# Patient Record
(demographics unavailable — no encounter records)

---

## 2025-01-24 NOTE — PHYSICAL EXAM
[de-identified] : The patient is a well developed, well nourished male in no apparent distress. He is alert and oriented X 3 with a pleasant mood and appropriate affect.     On physical examination of the left knee, his ROM is 0-12 degrees. The patient walks with a normal gait and stands in neutral alignment. There is no effusion. No warmth or erythema is noted. The patella is nontender to palpation There is patellofemoral crepitus noted. The apprehension and grind tests are negative. The extensor mechanism is intact. There is medial  joint line tenderness. The Ovidio sign is absent. The Lachman and pivot shift tests are negative. There is no varus or valgus laxity at 0 or 30 degrees. No posterolateral or anteromedial laxity is noted. No masses are palpable. No other soft tissue or bony tenderness is noted. T Quadriceps weakness is noted. Neurovascular function is intact.

## 2025-01-24 NOTE — PROCEDURE
[de-identified] : CC' left knee pain and clicking DX: left knee medial compartment DJD   Under strict sterile technique, the left  knee was prepped with Chloraprep Using the superolateral approach, with the patient supine, 1ml of Kenalog was mixed with 5mls of 1% Lidocaine and 5mLs of 0.5% Marcaine, and was injected intraarticularly. The patient tolerated the procedure well. The patient was instructed to avoid vigorous exercise for 24 hours and will apply ice to the knee for 20 minutes 2-3 times per day if discomfort occurs. Patient will return on an as needed basis. The patient will call if any questions or problems should arise  Injection was administered by Ron Vang MD

## 2025-01-24 NOTE — HISTORY OF PRESENT ILLNESS
[de-identified] : Jose returns today for evaluation of his left knee. he has DJD in both knees. He was doing well until Hiren. He did a lot of stair climbing and developed medial knee pain and clicking in his left knee. He has had intermittent swelling. He has ongoing pain with stairs and curbs. He denies any locking or buckling. He has been taking Tylenol with mild improvement.

## 2025-01-24 NOTE — DISCUSSION/SUMMARY
[de-identified] : Mr Velez has symptomatic DJD which was excerbated by extensive stair climbing over the Christmas holidays. He received a cortisone injection today. He will increase his activities as tolerated. He will return on an as needed basis. He will call if any issues arise

## 2025-01-24 NOTE — PROCEDURE
[de-identified] : CC' left knee pain and clicking DX: left knee medial compartment DJD   Under strict sterile technique, the left  knee was prepped with Chloraprep Using the superolateral approach, with the patient supine, 1ml of Kenalog was mixed with 5mls of 1% Lidocaine and 5mLs of 0.5% Marcaine, and was injected intraarticularly. The patient tolerated the procedure well. The patient was instructed to avoid vigorous exercise for 24 hours and will apply ice to the knee for 20 minutes 2-3 times per day if discomfort occurs. Patient will return on an as needed basis. The patient will call if any questions or problems should arise  Injection was administered by Ron Vang MD

## 2025-01-24 NOTE — REVIEW OF SYSTEMS
[Joint Pain] : joint pain [Joint Stiffness] : joint stiffness Admission Reconciliation is Not Complete  Discharge Reconciliation is Completed

## 2025-01-24 NOTE — END OF VISIT
[FreeTextEntry3] : Dr Vang personally administered the injection today. Dr Vang has reviewed the chart and agrees that the record accurately reflects his personal performance of the history, physical exam, assessment, and plan. He personally directed, reviewed, and agreed with the chart.All medical record  entries made by BRANDON Stokes, acting as a scribe for this encounter under the direction of Ron Vang MD

## 2025-01-24 NOTE — DISCUSSION/SUMMARY
[de-identified] : Mr Velez has symptomatic DJD which was excerbated by extensive stair climbing over the Christmas holidays. He received a cortisone injection today. He will increase his activities as tolerated. He will return on an as needed basis. He will call if any issues arise

## 2025-01-24 NOTE — HISTORY OF PRESENT ILLNESS
[de-identified] : Jose returns today for evaluation of his left knee. he has DJD in both knees. He was doing well until Hiren. He did a lot of stair climbing and developed medial knee pain and clicking in his left knee. He has had intermittent swelling. He has ongoing pain with stairs and curbs. He denies any locking or buckling. He has been taking Tylenol with mild improvement.

## 2025-01-24 NOTE — HISTORY OF PRESENT ILLNESS
[de-identified] : Jose returns today for evaluation of his left knee. he has DJD in both knees. He was doing well until Hiren. He did a lot of stair climbing and developed medial knee pain and clicking in his left knee. He has had intermittent swelling. He has ongoing pain with stairs and curbs. He denies any locking or buckling. He has been taking Tylenol with mild improvement.

## 2025-01-24 NOTE — PHYSICAL EXAM
[de-identified] : The patient is a well developed, well nourished male in no apparent distress. He is alert and oriented X 3 with a pleasant mood and appropriate affect.     On physical examination of the left knee, his ROM is 0-12 degrees. The patient walks with a normal gait and stands in neutral alignment. There is no effusion. No warmth or erythema is noted. The patella is nontender to palpation There is patellofemoral crepitus noted. The apprehension and grind tests are negative. The extensor mechanism is intact. There is medial  joint line tenderness. The Ovidio sign is absent. The Lachman and pivot shift tests are negative. There is no varus or valgus laxity at 0 or 30 degrees. No posterolateral or anteromedial laxity is noted. No masses are palpable. No other soft tissue or bony tenderness is noted. T Quadriceps weakness is noted. Neurovascular function is intact.

## 2025-01-24 NOTE — PROCEDURE
[de-identified] : CC' left knee pain and clicking DX: left knee medial compartment DJD   Under strict sterile technique, the left  knee was prepped with Chloraprep Using the superolateral approach, with the patient supine, 1ml of Kenalog was mixed with 5mls of 1% Lidocaine and 5mLs of 0.5% Marcaine, and was injected intraarticularly. The patient tolerated the procedure well. The patient was instructed to avoid vigorous exercise for 24 hours and will apply ice to the knee for 20 minutes 2-3 times per day if discomfort occurs. Patient will return on an as needed basis. The patient will call if any questions or problems should arise  Injection was administered by Ron Vang MD

## 2025-01-24 NOTE — PHYSICAL EXAM
[de-identified] : The patient is a well developed, well nourished male in no apparent distress. He is alert and oriented X 3 with a pleasant mood and appropriate affect.     On physical examination of the left knee, his ROM is 0-12 degrees. The patient walks with a normal gait and stands in neutral alignment. There is no effusion. No warmth or erythema is noted. The patella is nontender to palpation There is patellofemoral crepitus noted. The apprehension and grind tests are negative. The extensor mechanism is intact. There is medial  joint line tenderness. The Ovidio sign is absent. The Lachman and pivot shift tests are negative. There is no varus or valgus laxity at 0 or 30 degrees. No posterolateral or anteromedial laxity is noted. No masses are palpable. No other soft tissue or bony tenderness is noted. T Quadriceps weakness is noted. Neurovascular function is intact.

## 2025-01-24 NOTE — DISCUSSION/SUMMARY
[de-identified] : Mr Velez has symptomatic DJD which was excerbated by extensive stair climbing over the Christmas holidays. He received a cortisone injection today. He will increase his activities as tolerated. He will return on an as needed basis. He will call if any issues arise

## 2025-01-24 NOTE — END OF VISIT
[FreeTextEntry3] : Dr Vang personally administered the injection today. Dr Vang has reviewed the chart and agrees that the record accurately reflects his personal performance of the history, physical exam, assessment, and plan. He personally directed, reviewed, and agreed with the chart.All medical record  entries made by BRANDNO Stokes, acting as a scribe for this encounter under the direction of Ron Vang MD

## 2025-05-28 NOTE — DISCUSSION/SUMMARY
[de-identified] : Mr Velez has symptomatic DJD in his left knee. He is going on an active vacation in [place] and he received a cortisone injection today. He will increase his activities as tolerated. He will return on an as needed basis. He will call if any issues arise

## 2025-05-28 NOTE — DISCUSSION/SUMMARY
[de-identified] : Mr Velez has symptomatic DJD in his left knee. He is going on an active vacation in [place] and he received a cortisone injection today. He will increase his activities as tolerated. He will return on an as needed basis. He will call if any issues arise

## 2025-05-28 NOTE — PROCEDURE
[de-identified] : CC' left knee pain and clicking DX: left knee medial compartment DJD   Under strict sterile technique, the left  knee was prepped with Chloraprep Using the superolateral approach, with the patient supine, 1ml of Kenalog was mixed with 5mls of 1% Lidocaine and 5mLs of 0.5% Marcaine, and was injected intraarticularly. The patient tolerated the procedure well. The patient was instructed to avoid vigorous exercise for 24 hours and will apply ice to the knee for 20 minutes 2-3 times per day if discomfort occurs. Patient will return on an as needed basis. The patient will call if any questions or problems should arise  Injection was administered by Ron Vang MD

## 2025-05-28 NOTE — PROCEDURE
[de-identified] : CC' left knee pain and clicking DX: left knee medial compartment DJD   Under strict sterile technique, the left  knee was prepped with Chloraprep Using the superolateral approach, with the patient supine, 1ml of Kenalog was mixed with 5mls of 1% Lidocaine and 5mLs of 0.5% Marcaine, and was injected intraarticularly. The patient tolerated the procedure well. The patient was instructed to avoid vigorous exercise for 24 hours and will apply ice to the knee for 20 minutes 2-3 times per day if discomfort occurs. Patient will return on an as needed basis. The patient will call if any questions or problems should arise  Injection was administered by Ron Vang MD

## 2025-05-28 NOTE — HISTORY OF PRESENT ILLNESS
[de-identified] : Jose returns today for a cortisone injection in his left knee. He has symptomatic DJD and has managed well in the past with cortisone and HA injections. He is going to [place] next week and is anticipating a lot of walking and stair climbing. He has developed increased medial knee pain over the last month. He denies any locking or buckling.

## 2025-05-28 NOTE — PROCEDURE
[de-identified] : CC' left knee pain and clicking DX: left knee medial compartment DJD   Under strict sterile technique, the left  knee was prepped with Chloraprep Using the superolateral approach, with the patient supine, 1ml of Kenalog was mixed with 5mls of 1% Lidocaine and 5mLs of 0.5% Marcaine, and was injected intraarticularly. The patient tolerated the procedure well. The patient was instructed to avoid vigorous exercise for 24 hours and will apply ice to the knee for 20 minutes 2-3 times per day if discomfort occurs. Patient will return on an as needed basis. The patient will call if any questions or problems should arise  Injection was administered by Ron Vang MD

## 2025-05-28 NOTE — PHYSICAL EXAM
[de-identified] : The patient is a well developed, well nourished male in no apparent distress. He is alert and oriented X 3 with a pleasant mood and appropriate affect.     On physical examination of the left knee, his ROM is 0-12 degrees. The patient walks with a normal gait and stands in neutral alignment. There is no effusion. No warmth or erythema is noted. The patella is nontender to palpation There is patellofemoral crepitus noted. The apprehension and grind tests are negative. The extensor mechanism is intact. There is medial  joint line tenderness. The Ovidio sign is absent. The Lachman and pivot shift tests are negative. There is no varus or valgus laxity at 0 or 30 degrees. No posterolateral or anteromedial laxity is noted. No masses are palpable. No other soft tissue or bony tenderness is noted. T Quadriceps weakness is noted. Neurovascular function is intact.

## 2025-05-28 NOTE — HISTORY OF PRESENT ILLNESS
[de-identified] : Jose returns today for a cortisone injection in his left knee. He has symptomatic DJD and has managed well in the past with cortisone and HA injections. He is going to [place] next week and is anticipating a lot of walking and stair climbing. He has developed increased medial knee pain over the last month. He denies any locking or buckling.

## 2025-05-28 NOTE — PHYSICAL EXAM
[de-identified] : The patient is a well developed, well nourished male in no apparent distress. He is alert and oriented X 3 with a pleasant mood and appropriate affect.     On physical examination of the left knee, his ROM is 0-12 degrees. The patient walks with a normal gait and stands in neutral alignment. There is no effusion. No warmth or erythema is noted. The patella is nontender to palpation There is patellofemoral crepitus noted. The apprehension and grind tests are negative. The extensor mechanism is intact. There is medial  joint line tenderness. The Ovidio sign is absent. The Lachman and pivot shift tests are negative. There is no varus or valgus laxity at 0 or 30 degrees. No posterolateral or anteromedial laxity is noted. No masses are palpable. No other soft tissue or bony tenderness is noted. T Quadriceps weakness is noted. Neurovascular function is intact.

## 2025-05-28 NOTE — HISTORY OF PRESENT ILLNESS
[de-identified] : Jose returns today for a cortisone injection in his left knee. He has symptomatic DJD and has managed well in the past with cortisone and HA injections. He is going to [place] next week and is anticipating a lot of walking and stair climbing. He has developed increased medial knee pain over the last month. He denies any locking or buckling.

## 2025-05-28 NOTE — DISCUSSION/SUMMARY
[de-identified] : Mr Velez has symptomatic DJD in his left knee. He is going on an active vacation in [place] and he received a cortisone injection today. He will increase his activities as tolerated. He will return on an as needed basis. He will call if any issues arise

## 2025-05-28 NOTE — PHYSICAL EXAM
[de-identified] : The patient is a well developed, well nourished male in no apparent distress. He is alert and oriented X 3 with a pleasant mood and appropriate affect.     On physical examination of the left knee, his ROM is 0-12 degrees. The patient walks with a normal gait and stands in neutral alignment. There is no effusion. No warmth or erythema is noted. The patella is nontender to palpation There is patellofemoral crepitus noted. The apprehension and grind tests are negative. The extensor mechanism is intact. There is medial  joint line tenderness. The Ovidio sign is absent. The Lachman and pivot shift tests are negative. There is no varus or valgus laxity at 0 or 30 degrees. No posterolateral or anteromedial laxity is noted. No masses are palpable. No other soft tissue or bony tenderness is noted. T Quadriceps weakness is noted. Neurovascular function is intact.